# Patient Record
Sex: MALE | Race: WHITE
[De-identification: names, ages, dates, MRNs, and addresses within clinical notes are randomized per-mention and may not be internally consistent; named-entity substitution may affect disease eponyms.]

---

## 2022-08-01 ENCOUNTER — HOSPITAL ENCOUNTER (EMERGENCY)
Dept: HOSPITAL 47 - EC | Age: 42
Discharge: HOME | End: 2022-08-01
Payer: COMMERCIAL

## 2022-08-01 VITALS
TEMPERATURE: 98.8 F | SYSTOLIC BLOOD PRESSURE: 95 MMHG | RESPIRATION RATE: 20 BRPM | DIASTOLIC BLOOD PRESSURE: 75 MMHG | HEART RATE: 77 BPM

## 2022-08-01 DIAGNOSIS — Z20.822: ICD-10-CM

## 2022-08-01 DIAGNOSIS — I25.2: ICD-10-CM

## 2022-08-01 DIAGNOSIS — Z88.0: ICD-10-CM

## 2022-08-01 DIAGNOSIS — D64.9: ICD-10-CM

## 2022-08-01 DIAGNOSIS — Z90.49: ICD-10-CM

## 2022-08-01 DIAGNOSIS — R10.12: ICD-10-CM

## 2022-08-01 DIAGNOSIS — L89.90: Primary | ICD-10-CM

## 2022-08-01 LAB
ALBUMIN SERPL-MCNC: 3.5 G/DL (ref 3.5–5)
ALP SERPL-CCNC: 151 U/L (ref 38–126)
ALT SERPL-CCNC: 26 U/L (ref 4–49)
ANION GAP SERPL CALC-SCNC: 10 MMOL/L
APTT BLD: 24.4 SEC (ref 22–30)
AST SERPL-CCNC: 28 U/L (ref 17–59)
BASOPHILS # BLD AUTO: 0.1 K/UL (ref 0–0.2)
BASOPHILS NFR BLD AUTO: 1 %
BUN SERPL-SCNC: 26 MG/DL (ref 9–20)
CALCIUM SPEC-MCNC: 9 MG/DL (ref 8.4–10.2)
CHLORIDE SERPL-SCNC: 103 MMOL/L (ref 98–107)
CO2 SERPL-SCNC: 24 MMOL/L (ref 22–30)
EOSINOPHIL # BLD AUTO: 0.2 K/UL (ref 0–0.7)
EOSINOPHIL NFR BLD AUTO: 1 %
ERYTHROCYTE [DISTWIDTH] IN BLOOD BY AUTOMATED COUNT: 3.45 M/UL (ref 4.3–5.9)
ERYTHROCYTE [DISTWIDTH] IN BLOOD: 16.2 % (ref 11.5–15.5)
GLUCOSE SERPL-MCNC: 86 MG/DL (ref 74–99)
HCT VFR BLD AUTO: 31.5 % (ref 39–53)
HGB BLD-MCNC: 9.6 GM/DL (ref 13–17.5)
HYALINE CASTS UR QL AUTO: 1 /LPF (ref 0–2)
INR PPP: 1 (ref ?–1.2)
LYMPHOCYTES # SPEC AUTO: 1.6 K/UL (ref 1–4.8)
LYMPHOCYTES NFR SPEC AUTO: 12 %
MCH RBC QN AUTO: 27.9 PG (ref 25–35)
MCHC RBC AUTO-ENTMCNC: 30.6 G/DL (ref 31–37)
MCV RBC AUTO: 91.3 FL (ref 80–100)
MONOCYTES # BLD AUTO: 0.7 K/UL (ref 0–1)
MONOCYTES NFR BLD AUTO: 5 %
NEUTROPHILS # BLD AUTO: 11 K/UL (ref 1.3–7.7)
NEUTROPHILS NFR BLD AUTO: 80 %
PH UR: 6 [PH] (ref 5–8)
PLATELET # BLD AUTO: 784 K/UL (ref 150–450)
POTASSIUM SERPL-SCNC: 4.8 MMOL/L (ref 3.5–5.1)
PROT SERPL-MCNC: 7.5 G/DL (ref 6.3–8.2)
PROT UR QL: (no result)
PT BLD: 11.1 SEC (ref 9–12)
RBC UR QL: 6 /HPF (ref 0–5)
SODIUM SERPL-SCNC: 137 MMOL/L (ref 137–145)
SP GR UR: 1.03 (ref 1–1.03)
SQUAMOUS UR QL AUTO: <1 /HPF (ref 0–4)
UROBILINOGEN UR QL STRIP: 6 MG/DL (ref ?–2)
WBC # BLD AUTO: 13.7 K/UL (ref 3.8–10.6)
WBC #/AREA URNS HPF: 5 /HPF (ref 0–5)

## 2022-08-01 PROCEDURE — 83690 ASSAY OF LIPASE: CPT

## 2022-08-01 PROCEDURE — 36415 COLL VENOUS BLD VENIPUNCTURE: CPT

## 2022-08-01 PROCEDURE — 71046 X-RAY EXAM CHEST 2 VIEWS: CPT

## 2022-08-01 PROCEDURE — 85379 FIBRIN DEGRADATION QUANT: CPT

## 2022-08-01 PROCEDURE — 85610 PROTHROMBIN TIME: CPT

## 2022-08-01 PROCEDURE — 96374 THER/PROPH/DIAG INJ IV PUSH: CPT

## 2022-08-01 PROCEDURE — 99285 EMERGENCY DEPT VISIT HI MDM: CPT

## 2022-08-01 PROCEDURE — 81001 URINALYSIS AUTO W/SCOPE: CPT

## 2022-08-01 PROCEDURE — 84484 ASSAY OF TROPONIN QUANT: CPT

## 2022-08-01 PROCEDURE — 74176 CT ABD & PELVIS W/O CONTRAST: CPT

## 2022-08-01 PROCEDURE — 71275 CT ANGIOGRAPHY CHEST: CPT

## 2022-08-01 PROCEDURE — 85730 THROMBOPLASTIN TIME PARTIAL: CPT

## 2022-08-01 PROCEDURE — 80053 COMPREHEN METABOLIC PANEL: CPT

## 2022-08-01 PROCEDURE — 93005 ELECTROCARDIOGRAM TRACING: CPT

## 2022-08-01 PROCEDURE — 96361 HYDRATE IV INFUSION ADD-ON: CPT

## 2022-08-01 PROCEDURE — 85025 COMPLETE CBC W/AUTO DIFF WBC: CPT

## 2022-08-01 PROCEDURE — 87635 SARS-COV-2 COVID-19 AMP PRB: CPT

## 2022-08-01 PROCEDURE — 87040 BLOOD CULTURE FOR BACTERIA: CPT

## 2022-08-01 PROCEDURE — 83605 ASSAY OF LACTIC ACID: CPT

## 2022-08-01 RX ADMIN — NICARDIPINE HYDROCHLORIDE SCH MLS/HR: 2.5 INJECTION INTRAVENOUS at 16:28

## 2022-08-01 RX ADMIN — NICARDIPINE HYDROCHLORIDE SCH MLS/HR: 2.5 INJECTION INTRAVENOUS at 17:28

## 2022-08-01 NOTE — ED
General Adult HPI





- General


Chief complaint: Chest Pain


Stated complaint: Chest pressure,rib pain


Time Seen by Provider: 08/01/22 15:11


Source: patient, RN notes reviewed, old records reviewed


Mode of arrival: wheelchair


Limitations: no limitations





- History of Present Illness


Initial comments: 





Is a 42-year-old male who is a paraplegic.  Patient states the last month and a 

half is been having some left upper quadrant abdominal pain with nausea.  Patie

nt states she's also had a little bit of a cough.  Patient states he is also 

having pain just below the sternum.  Patient denies chest pain to me even though

he mentions how far past month 3 separate occasions and he said he is not having

chest pain today but he has had some in the past but not today.  Patient denies 

any fever chills however on arrival he did have a low-grade fever.  Patient 

states he's had a history of urinary tract infections he is supposed to do self-

catheterization but he does not.  Patient also states he stopped taking his 

eliquis about a week ago.  Patient states he has been recently admitted to other

facilities for the same discomfort but they did not figure out why he is having 

pain so he decided to come to [facility] today.





- Related Data


                                Home Medications











 Medication  Instructions  Recorded  Confirmed


 


Acetaminophen Tab [Tylenol Tab] 1,000 mg PO Q6H PRN 08/01/22 08/01/22


 


Apixaban [Eliquis] 5 mg PO BID 08/01/22 08/01/22


 


HYDROcodone/APAP 10-325MG [Norco 1 tab PO BID PRN 08/01/22 08/01/22





]   


 


Midodrine HCl [ProAmatine] 5 mg PO TID 08/01/22 08/01/22


 


methocarbamoL [Robaxin] 500 mg PO TID 08/01/22 08/01/22











                                    Allergies











Allergy/AdvReac Type Severity Reaction Status Date / Time


 


Penicillins Allergy  Unknown Verified 08/01/22 18:20





   Childhood  














Review of Systems


ROS Statement: 


Those systems with pertinent positive or pertinent negative responses have been 

documented in the HPI.





ROS Other: All systems not noted in ROS Statement are negative.





Past Medical History


Past Medical History: Myocardial Infarction (MI), Renal Disease


Additional Past Medical History / Comment(s): lower extremity paralysis, sacral 

wound


History of Any Multi-Drug Resistant Organisms: MRSA


Date of last positivie culture/infection: 2021


MDRO Source:: bilateral legs


Past Surgical History: Appendectomy, Back Surgery


Past Psychological History: No Psychological Hx Reported


Smoking Status: Never smoker


Past Alcohol Use History: None Reported


Past Drug Use History: None Reported





General Exam





- General Exam Comments


Initial Comments: 





GENERAL:


Patient is well-developed and well-nourished.  Patient is nontoxic and well-

hydrated and is in mild distress.





ENT:


Neck is soft and supple.  No significant lymphadenopathy is noted.  Oropharynx 

is clear.  Moist mucous membranes.  Neck has full range of motion without 

eliciting any pain.  





EYES:


The sclera were anicteric and conjunctiva were pink and moist.  Extraocular 

movements were intact and pupils were equal round and reactive to light.  Eye

lids were unremarkable.





PULMONARY:


Unlabored respirations.  Good breath sounds bilaterally.  No audible rales 

rhonchi or wheezing was noted.





CARDIOVASCULAR:


There is a regular rate and rhythm without any murmurs gallops or rubs.  





ABDOMEN:


Soft and nontender with normal bowel sounds. 





SKIN:


Skin is clear with no lesions or rashes and otherwise unremarkable.





NEUROLOGIC:


Patient is alert and oriented x3.  Cranial nerves II through XII are grossly 

intact.  Patient's upper extremity motor and sensory are at baseline.  Normal 

speech, volume and content.  Symmetrical smile.  





MUSCULOSKELETAL:


Patient is unable to move his leg secondary to an injury 2 years ago.





LYMPHATICS:


No significant lymphadenopathy is noted





PSYCHIATRIC:


Normal psychiatric evaluation. 


Limitations: no limitations





Course


                                   Vital Signs











  08/01/22 08/01/22 08/01/22





  14:14 16:44 18:48


 


Temperature 98.3 F  


 


Pulse Rate 118 H 87 100


 


Respiratory 20 16 18





Rate   


 


Blood Pressure 106/67 110/71 105/65


 


O2 Sat by Pulse 98  





Oximetry   














Medical Decision Making





- Medical Decision Making





EKG shows sinus tachycardia 122 bpm MD interval 133 QRS 77 QT interval 337 QTC 

is 410.  Patient's EKG shows some T-wave inversions in inferior leads.





Chest x-ray shows no acute abnormality.  I did a CT of the chest because the 

patient had an elevated d-dimer





I did look at the patient's multiple decubitus ulcers none looked infected one 

had a wound VAC on.





I spoke with the patient he did not want to be admitted he stated he would 

follow-up at home with his doctor if things got worse he would come back again.





CT of the abdomen and pelvis showed no acute abnormality.





- Lab Data


Result diagrams: 


                                 08/01/22 15:54





                                 08/01/22 15:54


                                   Lab Results











  08/01/22 08/01/22 08/01/22 Range/Units





  15:34 15:54 15:54 


 


WBC   13.7 H   (3.8-10.6)  k/uL


 


RBC   3.45 L   (4.30-5.90)  m/uL


 


Hgb   9.6 L D   (13.0-17.5)  gm/dL


 


Hct   31.5 L   (39.0-53.0)  %


 


MCV   91.3   (80.0-100.0)  fL


 


MCH   27.9   (25.0-35.0)  pg


 


MCHC   30.6 L   (31.0-37.0)  g/dL


 


RDW   16.2 H   (11.5-15.5)  %


 


Plt Count   784 H   (150-450)  k/uL


 


MPV   7.3   


 


Neutrophils %   80   %


 


Lymphocytes %   12   %


 


Monocytes %   5   %


 


Eosinophils %   1   %


 


Basophils %   1   %


 


Neutrophils #   11.0 H   (1.3-7.7)  k/uL


 


Lymphocytes #   1.6   (1.0-4.8)  k/uL


 


Monocytes #   0.7   (0-1.0)  k/uL


 


Eosinophils #   0.2   (0-0.7)  k/uL


 


Basophils #   0.1   (0-0.2)  k/uL


 


Hypochromasia   Marked   


 


Anisocytosis   Slight   


 


PT    11.1  (9.0-12.0)  sec


 


INR    1.0  (<1.2)  


 


APTT    24.4  (22.0-30.0)  sec


 


D-Dimer  0.63 H    (<0.60)  mg/L FEU


 


Sodium     (137-145)  mmol/L


 


Potassium     (3.5-5.1)  mmol/L


 


Chloride     ()  mmol/L


 


Carbon Dioxide     (22-30)  mmol/L


 


Anion Gap     mmol/L


 


BUN     (9-20)  mg/dL


 


Creatinine     (0.66-1.25)  mg/dL


 


Est GFR (CKD-EPI)AfAm     (>60 ml/min/1.73 sqM)  


 


Est GFR (CKD-EPI)NonAf     (>60 ml/min/1.73 sqM)  


 


Glucose     (74-99)  mg/dL


 


Plasma Lactic Acid Robert     (0.7-2.0)  mmol/L


 


Calcium     (8.4-10.2)  mg/dL


 


Total Bilirubin     (0.2-1.3)  mg/dL


 


AST     (17-59)  U/L


 


ALT     (4-49)  U/L


 


Alkaline Phosphatase     ()  U/L


 


Troponin I     (0.000-0.034)  ng/mL


 


Total Protein     (6.3-8.2)  g/dL


 


Albumin     (3.5-5.0)  g/dL


 


Lipase     ()  U/L


 


Urine Color     


 


Urine Appearance     (Clear)  


 


Urine pH     (5.0-8.0)  


 


Ur Specific Gravity     (1.001-1.035)  


 


Urine Protein     (Negative)  


 


Urine Glucose (UA)     (Negative)  


 


Urine Ketones     (Negative)  


 


Urine Blood     (Negative)  


 


Urine Nitrite     (Negative)  


 


Urine Bilirubin     (Negative)  


 


Urine Urobilinogen     (<2.0)  mg/dL


 


Ur Leukocyte Esterase     (Negative)  


 


Urine RBC     (0-5)  /hpf


 


Urine WBC     (0-5)  /hpf


 


Ur Squamous Epith Cells     (0-4)  /hpf


 


Urine Bacteria     (None)  /hpf


 


Hyaline Casts     (0-2)  /lpf


 


Urine Mucus     (None)  /hpf


 


Coronavirus (PCR)     (Not Detectd)  














  08/01/22 08/01/22 08/01/22 Range/Units





  15:54 15:54 16:04 


 


WBC     (3.8-10.6)  k/uL


 


RBC     (4.30-5.90)  m/uL


 


Hgb     (13.0-17.5)  gm/dL


 


Hct     (39.0-53.0)  %


 


MCV     (80.0-100.0)  fL


 


MCH     (25.0-35.0)  pg


 


MCHC     (31.0-37.0)  g/dL


 


RDW     (11.5-15.5)  %


 


Plt Count     (150-450)  k/uL


 


MPV     


 


Neutrophils %     %


 


Lymphocytes %     %


 


Monocytes %     %


 


Eosinophils %     %


 


Basophils %     %


 


Neutrophils #     (1.3-7.7)  k/uL


 


Lymphocytes #     (1.0-4.8)  k/uL


 


Monocytes #     (0-1.0)  k/uL


 


Eosinophils #     (0-0.7)  k/uL


 


Basophils #     (0-0.2)  k/uL


 


Hypochromasia     


 


Anisocytosis     


 


PT     (9.0-12.0)  sec


 


INR     (<1.2)  


 


APTT     (22.0-30.0)  sec


 


D-Dimer     (<0.60)  mg/L FEU


 


Sodium  137    (137-145)  mmol/L


 


Potassium  4.8    (3.5-5.1)  mmol/L


 


Chloride  103    ()  mmol/L


 


Carbon Dioxide  24    (22-30)  mmol/L


 


Anion Gap  10    mmol/L


 


BUN  26 H    (9-20)  mg/dL


 


Creatinine  1.13    (0.66-1.25)  mg/dL


 


Est GFR (CKD-EPI)AfAm  >90    (>60 ml/min/1.73 sqM)  


 


Est GFR (CKD-EPI)NonAf  80    (>60 ml/min/1.73 sqM)  


 


Glucose  86    (74-99)  mg/dL


 


Plasma Lactic Acid Robert    2.0  (0.7-2.0)  mmol/L


 


Calcium  9.0    (8.4-10.2)  mg/dL


 


Total Bilirubin  0.3    (0.2-1.3)  mg/dL


 


AST  28    (17-59)  U/L


 


ALT  26    (4-49)  U/L


 


Alkaline Phosphatase  151 H    ()  U/L


 


Troponin I   <0.012   (0.000-0.034)  ng/mL


 


Total Protein  7.5    (6.3-8.2)  g/dL


 


Albumin  3.5    (3.5-5.0)  g/dL


 


Lipase     ()  U/L


 


Urine Color     


 


Urine Appearance     (Clear)  


 


Urine pH     (5.0-8.0)  


 


Ur Specific Gravity     (1.001-1.035)  


 


Urine Protein     (Negative)  


 


Urine Glucose (UA)     (Negative)  


 


Urine Ketones     (Negative)  


 


Urine Blood     (Negative)  


 


Urine Nitrite     (Negative)  


 


Urine Bilirubin     (Negative)  


 


Urine Urobilinogen     (<2.0)  mg/dL


 


Ur Leukocyte Esterase     (Negative)  


 


Urine RBC     (0-5)  /hpf


 


Urine WBC     (0-5)  /hpf


 


Ur Squamous Epith Cells     (0-4)  /hpf


 


Urine Bacteria     (None)  /hpf


 


Hyaline Casts     (0-2)  /lpf


 


Urine Mucus     (None)  /hpf


 


Coronavirus (PCR)     (Not Detectd)  














  08/01/22 08/01/22 08/01/22 Range/Units





  16:22 16:40 17:57 


 


WBC     (3.8-10.6)  k/uL


 


RBC     (4.30-5.90)  m/uL


 


Hgb     (13.0-17.5)  gm/dL


 


Hct     (39.0-53.0)  %


 


MCV     (80.0-100.0)  fL


 


MCH     (25.0-35.0)  pg


 


MCHC     (31.0-37.0)  g/dL


 


RDW     (11.5-15.5)  %


 


Plt Count     (150-450)  k/uL


 


MPV     


 


Neutrophils %     %


 


Lymphocytes %     %


 


Monocytes %     %


 


Eosinophils %     %


 


Basophils %     %


 


Neutrophils #     (1.3-7.7)  k/uL


 


Lymphocytes #     (1.0-4.8)  k/uL


 


Monocytes #     (0-1.0)  k/uL


 


Eosinophils #     (0-0.7)  k/uL


 


Basophils #     (0-0.2)  k/uL


 


Hypochromasia     


 


Anisocytosis     


 


PT     (9.0-12.0)  sec


 


INR     (<1.2)  


 


APTT     (22.0-30.0)  sec


 


D-Dimer     (<0.60)  mg/L FEU


 


Sodium     (137-145)  mmol/L


 


Potassium     (3.5-5.1)  mmol/L


 


Chloride     ()  mmol/L


 


Carbon Dioxide     (22-30)  mmol/L


 


Anion Gap     mmol/L


 


BUN     (9-20)  mg/dL


 


Creatinine     (0.66-1.25)  mg/dL


 


Est GFR (CKD-EPI)AfAm     (>60 ml/min/1.73 sqM)  


 


Est GFR (CKD-EPI)NonAf     (>60 ml/min/1.73 sqM)  


 


Glucose     (74-99)  mg/dL


 


Plasma Lactic Acid Robert     (0.7-2.0)  mmol/L


 


Calcium     (8.4-10.2)  mg/dL


 


Total Bilirubin     (0.2-1.3)  mg/dL


 


AST     (17-59)  U/L


 


ALT     (4-49)  U/L


 


Alkaline Phosphatase     ()  U/L


 


Troponin I     (0.000-0.034)  ng/mL


 


Total Protein     (6.3-8.2)  g/dL


 


Albumin     (3.5-5.0)  g/dL


 


Lipase    52  ()  U/L


 


Urine Color  Yellow    


 


Urine Appearance  Clear    (Clear)  


 


Urine pH  6.0    (5.0-8.0)  


 


Ur Specific Gravity  1.035    (1.001-1.035)  


 


Urine Protein  1+ H    (Negative)  


 


Urine Glucose (UA)  Negative    (Negative)  


 


Urine Ketones  Negative    (Negative)  


 


Urine Blood  Negative    (Negative)  


 


Urine Nitrite  Negative    (Negative)  


 


Urine Bilirubin  Negative    (Negative)  


 


Urine Urobilinogen  6.0    (<2.0)  mg/dL


 


Ur Leukocyte Esterase  Trace H    (Negative)  


 


Urine RBC  6 H    (0-5)  /hpf


 


Urine WBC  5    (0-5)  /hpf


 


Ur Squamous Epith Cells  <1    (0-4)  /hpf


 


Urine Bacteria  Rare H    (None)  /hpf


 


Hyaline Casts  1    (0-2)  /lpf


 


Urine Mucus  Rare H    (None)  /hpf


 


Coronavirus (PCR)   Not Detected   (Not Detectd)  














Disposition


Clinical Impression: 


 Chronic abdominal pain, Decubitus ulcers, Anemia





Disposition: HOME SELF-CARE


Condition: Good


Instructions (If sedation given, give patient instructions):  Abdominal Pain 

(ED)


Is patient prescribed a controlled substance at d/c from ED?: No


Referrals: 


None,Stated [Primary Care Provider] - 1-2 days


Time of Disposition: 19:29

## 2022-08-01 NOTE — CT
EXAMINATION TYPE: CT abdomen pelvis wo con

 

DATE OF EXAM: 8/1/2022

 

COMPARISON: None

 

HISTORY: nausea, abd pain

 

CT DLP: 494.3 mGycm

Automated exposure control for dose reduction was used.

 

Images obtained from the diaphragm to the floor of the pelvis with no contrast. There is some contras
t in the renal collecting systems from previous exam.

 

There is some mild pleural thickening and atelectasis at the right posterior lung base. Liver spleen 
and stomach pancreas and gallbladder appear intact. The bile ducts are not dilated.

 

There is no adrenal mass. Kidneys show satisfactory renal excretion. There is some cortical thinning 
anterior upper pole right kidney that could be scarring. The ureters are not dilated. Bladder distend
s smoothly without contrast. There is a Pina catheter in the urinary bladder. Urinary bladder is melida
ost empty. Urinary bladder wall could not be evaluated because of the contraction.

 

The lumbar vertebra appear intact. No compression fracture. Posterior elements are intact. The bony p
melchor is intact. Hip joints are intact.

 

There is no mesenteric edema. No ascites or free air. No sign of a bowel obstruction. Appendix is not
 seen.

 

IMPRESSION:

No significant abnormality of the abdomen and pelvis.

## 2022-08-01 NOTE — XR
EXAMINATION TYPE: XR chest 2V

 

DATE OF EXAM: 8/1/2022

 

COMPARISON: NONE

 

HISTORY: Fever

 

TECHNIQUE: 2 views

 

FINDINGS: Heart is normal. Lungs are clear of consolidation. There is posterior fusion surgery in the
 mid thoracic spine. There are no hilar masses. Costophrenic angles are clear. There are chest leads.


 

IMPRESSION: No active cardiopulmonary disease. Normal heart.

## 2022-08-01 NOTE — CT
EXAMINATION TYPE: CT chest angio for PE

 

DATE OF EXAM: 8/1/2022

 

COMPARISON: None

 

HISTORY: elevated d dimer

 

CT DLP: 431.6 mGycm

Automated exposure control for dose reduction was used.

 

CONTRAST: 

Performed with IV Contrast, patient injected with 100 mL of Isovue 370.

Images obtained from the thoracic inlet to the diaphragm with the IV contrast. There are 3-D postproc
essed images.

 

There is some minimal pleural thickening and atelectasis at the right posterior lung base. There is b
ilateral rods fusing the lower thoracic spine. Fusion is from T8 to T12 level. There is T11 compressi
on fracture with 15% loss of height. There is a T10 and T10-11 subluxation deformity.

 

Heart size is normal. No pericardial effusion. No evidence of filling defect in the pulmonary arterie
s. There are no hilar masses. There is no mediastinal adenopathy. Thoracic aorta is intact. No aneury
sm or dissection. Ascending aorta measures 3.1 cm. Sternum is intact.

 

IMPRESSION:

No evidence of pulmonary embolism. Lower thoracic posterior spine fusion surgery. Mild pleural thicke
mercedes and infiltrate and atelectasis right posterior lung base.